# Patient Record
Sex: MALE
[De-identification: names, ages, dates, MRNs, and addresses within clinical notes are randomized per-mention and may not be internally consistent; named-entity substitution may affect disease eponyms.]

---

## 2023-08-17 ENCOUNTER — APPOINTMENT (OUTPATIENT)
Dept: PULMONOLOGY | Facility: CLINIC | Age: 83
End: 2023-08-17

## 2023-08-17 PROBLEM — Z00.00 ENCOUNTER FOR PREVENTIVE HEALTH EXAMINATION: Status: ACTIVE | Noted: 2023-08-17

## 2023-09-01 ENCOUNTER — APPOINTMENT (OUTPATIENT)
Dept: PULMONOLOGY | Facility: CLINIC | Age: 83
End: 2023-09-01
Payer: MEDICARE

## 2023-09-01 VITALS
DIASTOLIC BLOOD PRESSURE: 44 MMHG | TEMPERATURE: 96.6 F | SYSTOLIC BLOOD PRESSURE: 92 MMHG | HEART RATE: 73 BPM | OXYGEN SATURATION: 94 %

## 2023-09-01 DIAGNOSIS — Z86.79 PERSONAL HISTORY OF OTHER DISEASES OF THE CIRCULATORY SYSTEM: ICD-10-CM

## 2023-09-01 DIAGNOSIS — Z95.5 PRESENCE OF CORONARY ANGIOPLASTY IMPLANT AND GRAFT: ICD-10-CM

## 2023-09-01 DIAGNOSIS — Z86.39 PERSONAL HISTORY OF OTHER ENDOCRINE, NUTRITIONAL AND METABOLIC DISEASE: ICD-10-CM

## 2023-09-01 DIAGNOSIS — E78.5 HYPERLIPIDEMIA, UNSPECIFIED: ICD-10-CM

## 2023-09-01 DIAGNOSIS — R93.89 ABNORMAL FINDINGS ON DIAGNOSTIC IMAGING OF OTHER SPECIFIED BODY STRUCTURES: ICD-10-CM

## 2023-09-01 DIAGNOSIS — Z87.891 PERSONAL HISTORY OF NICOTINE DEPENDENCE: ICD-10-CM

## 2023-09-01 PROCEDURE — 94060 EVALUATION OF WHEEZING: CPT

## 2023-09-01 PROCEDURE — 99204 OFFICE O/P NEW MOD 45 MIN: CPT | Mod: 25

## 2023-09-01 RX ORDER — ALPRAZOLAM 2 MG/1
TABLET ORAL
Refills: 0 | Status: ACTIVE | COMMUNITY

## 2023-09-01 RX ORDER — METOPROLOL SUCCINATE 200 MG/1
TABLET, EXTENDED RELEASE ORAL
Refills: 0 | Status: ACTIVE | COMMUNITY

## 2023-09-01 RX ORDER — SIMVASTATIN 80 MG/1
TABLET, FILM COATED ORAL
Refills: 0 | Status: ACTIVE | COMMUNITY

## 2023-09-01 RX ORDER — LISINOPRIL 5 MG/1
5 TABLET ORAL
Refills: 0 | Status: ACTIVE | COMMUNITY

## 2023-09-01 RX ORDER — FUROSEMIDE 80 MG/1
TABLET ORAL
Refills: 0 | Status: ACTIVE | COMMUNITY

## 2023-09-01 RX ORDER — SITAGLIPTIN 100 MG/1
TABLET, FILM COATED ORAL
Refills: 0 | Status: ACTIVE | COMMUNITY

## 2023-09-01 RX ORDER — CLOPIDOGREL BISULFATE 75 MG/1
75 TABLET, FILM COATED ORAL
Refills: 0 | Status: ACTIVE | COMMUNITY

## 2023-09-01 RX ORDER — SPIRONOLACTONE 25 MG/1
25 TABLET ORAL
Refills: 0 | Status: ACTIVE | COMMUNITY

## 2023-09-01 RX ORDER — METFORMIN HYDROCHLORIDE 625 MG/1
TABLET ORAL
Refills: 0 | Status: ACTIVE | COMMUNITY

## 2023-09-01 NOTE — HISTORY OF PRESENT ILLNESS
[Former] : former [TextBox_4] : He is an 83-year-old man.  He was admitted to [facility] in March of this year for shortness of breath and leg edema.  He was found to be in congestive heart failure and to have coronary artery disease.  Two coronary stents were placed.  He denies any history of pulmonary disease.  He stopped smoking in the remote past.  The occupational history was unremarkable.  He is in a wheelchair and he is on oxygen.  He becomes short of breath but he is able to walk within his home.  He monitors his pulse oximetry at home and it is generally between 93 and 96% on room air. [YearQuit] : 1970 [Difficulty Maintaining Sleep] : does not have difficulty maintaining sleep

## 2023-09-01 NOTE — DISCUSSION/SUMMARY
[FreeTextEntry1] : He is an 83-year-old man.  He was admitted to [facility] in March of this year for shortness of breath and leg edema.  He was found to be in congestive heart failure and to have coronary artery disease.  Two coronary stents were placed.  He denies any history of pulmonary disease.  He stopped smoking in the remote past.  The occupational history was unremarkable.  He is in a wheelchair and he is on oxygen.  He becomes short of breath but he is able to walk within his home.  He monitors his pulse oximetry at home and it is generally between 93 and 96% on room air.  Impression Congestive heart failure -Improved on therapy It is not clear if there is any primary pulmonary issue -He was unable to complete pulmonary function testing -No prior history of pulmonary disease  Recommend Obtain follow-up chest radiograph Continue with oxygen as is being used Follow-up in 3 months

## 2023-09-01 NOTE — REVIEW OF SYSTEMS
[SOB on Exertion] : sob on exertion [Claudication] : claudication [Edema] : edema [Diabetes] : diabetes [Fever] : no fever [Fatigue] : no fatigue [Postnasal Drip] : no postnasal drip [Cough] : no cough [Chest Tightness] : no chest tightness [Palpitations] : no palpitations [Arthralgias] : no arthralgias [GERD] : no gerd [Myalgias] : no myalgias [Rash] : no rash [Anemia] : no anemia [Headache] : no headache [Anxiety] : no anxiety

## 2023-09-01 NOTE — PHYSICAL EXAM
[No Acute Distress] : no acute distress [No Neck Mass] : no neck mass [Normal Rate/Rhythm] : normal rate/rhythm [Normal S1, S2] : normal s1, s2 [No Resp Distress] : no resp distress [Clear to Auscultation Bilaterally] : clear to auscultation bilaterally [No Abnormalities] : no abnormalities [No HSM] : no hsm [Normal Gait] : normal gait [No Cyanosis] : no cyanosis [Normal Turgor] : normal turgor [No Focal Deficits] : no focal deficits [Oriented x3] : oriented x3

## 2023-09-01 NOTE — PROCEDURE
[FreeTextEntry1] : Chest x-ray 3/7/23: Patchy bilateral opacity consistent with pulmonary edema.  PFT 9/1/23: He had difficulty in performing the study.  Spirometry suggested moderate obstruction.  He could not complete lung volume and diffusion measurement.  No significant change was noted after inhalation of a bronchodilator.

## 2023-12-01 ENCOUNTER — APPOINTMENT (OUTPATIENT)
Dept: PULMONOLOGY | Facility: CLINIC | Age: 83
End: 2023-12-01